# Patient Record
Sex: MALE | Race: ASIAN | NOT HISPANIC OR LATINO
[De-identification: names, ages, dates, MRNs, and addresses within clinical notes are randomized per-mention and may not be internally consistent; named-entity substitution may affect disease eponyms.]

---

## 2020-01-02 VITALS
HEART RATE: 88 BPM | TEMPERATURE: 99 F | WEIGHT: 190.04 LBS | DIASTOLIC BLOOD PRESSURE: 62 MMHG | HEIGHT: 64 IN | OXYGEN SATURATION: 98 % | SYSTOLIC BLOOD PRESSURE: 127 MMHG

## 2020-01-02 RX ORDER — CHLORHEXIDINE GLUCONATE 213 G/1000ML
1 SOLUTION TOPICAL ONCE
Refills: 0 | Status: DISCONTINUED | OUTPATIENT
Start: 2020-01-07 | End: 2020-01-07

## 2020-01-02 NOTE — H&P ADULT - HISTORY OF PRESENT ILLNESS
***SKELETON*** (Patient did not answer phone)    53yo female _(smoking status)__ with __(FHx)__ and a PMHx of DM (HgbA1c 5.90 on 10/31/19), HTN, and AS __(ask if anything else)__ who presented to Dr. Sriram Phan on 12/26/19 for evaluation of chest pain (non-exertional, self-resolving, __expand if necessary__) and aortic stenosis. Pt denies palpitations, SOB, N/V, lightheadedness, dizziness. Echo (11/26/19): Normal LV size; EF 55-60%; Marked calcification and reduced cuspal separation on aortic valve (moderate AS); mild MR. CCTA (11/27/19): total calcium score 25 (59th percentile), LM normal, LAD minor luminal irregularities, LCx calcified plaque in proximal segment with 50-75% stenosis, RCA minor luminal irregularities.    PAST LABS:  -11/27/19: WBC 7.0, Hgb 13.1, Hct 40.3, Platelet 231, Cr 0.9, K 4.7.   -12/26/19: WBC 7.5, Hgb 14.0, Hct 41.5, Platelet 264, Cr 0.92, K 4.4.    In light of patient’s risk factors, CCS class III symptoms, and abnormal CCTA results patient presents to [facility] for cardiac catheterization with possible intervention if clinically indicated. ***SKELETON*** (Patient did not answer phone)    55yo female _(smoking status)__ with __(FHx)__ and a PMHx of DM (HgbA1c 5.90 on 10/31/19), HTN, and AS __(ask if anything else)__ who presented to Dr. Sriram Phan on 12/26/19 for evaluation of chest pain (non-exertional, self-resolving, __expand if necessary__) and aortic stenosis. Pt denies palpitations, SOB, N/V, lightheadedness, dizziness. Echo (11/26/19): Normal LV size; EF 55-60%; Marked calcification and reduced cuspal separation on aortic valve (moderate AS), aortic valve mean gradient 24mmHg and estimated valve area 1.3; mild MR. CCTA (11/27/19): total calcium score 25 (59th percentile), LM normal, LAD minor luminal irregularities, LCx calcified plaque in proximal segment with 50-75% stenosis, RCA minor luminal irregularities; ascending aortic dilatation of 4.3cm noted.    PAST LABS:  -11/27/19: WBC 7.0, Hgb 13.1, Hct 40.3, Platelet 231, Cr 0.9, K 4.7.   -12/26/19: WBC 7.5, Hgb 14.0, Hct 41.5, Platelet 264, Cr 0.92, K 4.4.    In light of patient’s risk factors, CCS class III symptoms, and abnormal CCTA results patient presents to [facility] for cardiac catheterization with possible intervention if clinically indicated. ***SKELETON*** (Patient did not answer phone)    53yo male _(smoking status)__ with __(FHx)__ and a PMHx of DM (HgbA1c 5.90 on 10/31/19), HTN, and AS __(ask if anything else)__ who presented to Dr. Sriram Phan on 12/26/19 for evaluation of chest pain (non-exertional, self-resolving, __expand if necessary__) and aortic stenosis. Pt denies palpitations, SOB, N/V, lightheadedness, dizziness. Echo (11/26/19): Normal LV size; EF 55-60%; Marked calcification and reduced cuspal separation on aortic valve (moderate AS), aortic valve mean gradient 24mmHg and estimated valve area 1.3; mild MR. CCTA (11/27/19): total calcium score 25 (59th percentile), LM normal, LAD minor luminal irregularities, LCx calcified plaque in proximal segment with 50-75% stenosis, RCA minor luminal irregularities; ascending aortic dilatation of 4.3cm noted.    PAST LABS:  -11/27/19: WBC 7.0, Hgb 13.1, Hct 40.3, Platelet 231, Cr 0.9, K 4.7.   -12/26/19: WBC 7.5, Hgb 14.0, Hct 41.5, Platelet 264, Cr 0.92, K 4.4.    In light of patient’s risk factors, CCS class III symptoms, and abnormal CCTA results patient presents to [facility] for cardiac catheterization with possible intervention if clinically indicated. 53yo male _(smoking status)__ with __(FHx)__ and a PMHx of DM (HgbA1c 5.90 on 10/31/19), HTN, and AS __(ask if anything else)__ who presented to Dr. Sriram Phan on 12/26/19 for evaluation of chest pain (non-exertional, self-resolving, __expand if necessary__) and aortic stenosis. Pt denies palpitations, SOB, N/V, lightheadedness, dizziness. Echo (11/26/19): Normal LV size; EF 55-60%; Marked calcification and reduced cuspal separation on aortic valve (moderate AS), aortic valve mean gradient 24mmHg and estimated valve area 1.3; mild MR. CCTA (11/27/19): total calcium score 25 (59th percentile), LM normal, LAD minor luminal irregularities, LCx calcified plaque in proximal segment with 50-75% stenosis, RCA minor luminal irregularities; ascending aortic dilatation of 4.3cm noted.    In light of patient’s risk factors, CCS class III symptoms, and abnormal CCTA results patient presents to [facility] for cardiac catheterization with possible intervention if clinically indicated. History obtained via [employer] , [place], ID # [id number].    53 y/o male, Irish speaking, with PMHx of HTN, HLD, DM (HgbA1c 5.90 on 10/31/19, diet controlled), CAD, s/p Cardiac Cath 3/2017 with Dr. Phan @ [facility] with questionable PCI/Stents (per pt), moderate Aortic Stenosis on recent ECHO.  Pt presented to Dr. Sriram Phan on 12/26/19 with c/o worsening, non-exertional,  intermittent, 5/10,  left-sided chest pain, lasting 1-2 hours, a/w mild SOB. Pt also endorsed "head feeling hot" most of the times and ongoing constipation despite being on bowel regimen.   Pt denies palpitations, diaphoresis, syncope, LOC, lightheadedness, dizziness, abdominal pain/discomfort, N/V, bleeding issues, prior MI, CHF or CVA.  Echo (11/26/19): Normal LV size; EF 55-60%; Marked calcification and reduced cuspal separation on aortic valve (moderate AS), aortic valve mean gradient 24mmHg and estimated valve area 1.3; mild MR. CCTA (11/27/19): total calcium score 25 (59th percentile), LM normal, LAD minor luminal irregularities, LCx calcified plaque in proximal segment with 50-75% stenosis, RCA minor luminal irregularities; ascending aortic dilatation of 4.3cm noted.    In light of patient’s risk factors, CCS class III symptoms, and abnormal CCTA results patient presents to [facility] for Right and Left Heart Catheterization with possible intervention if clinically indicated. History obtained via [employer] , [place], ID # [id number].    53 y/o male, Hebrew speaking, with PMHx of HTN, HLD, DM (HgbA1c 5.90 on 10/31/19, diet controlled), CAD, s/p Cardiac Cath 3/2017 with Dr. Phan @ [facility] with questionable PCI/Stents (per pt), moderate Aortic Stenosis on recent ECHO.  Pt presented to Dr. Sriram Phan on 12/26/19 with c/o worsening, non-exertional,  intermittent, 5/10,  left-sided chest pain, lasting 1-2 hours, a/w mild SOB. Pt also endorsed "head feeling hot" most of the times and ongoing constipation despite being on bowel regimen.   Pt denies palpitations, diaphoresis, syncope, LOC, lightheadedness, dizziness, abdominal pain/discomfort, N/V, bleeding issues, prior MI, CHF or CVA.  Echo (11/26/19): Normal LV size; EF 55-60%; Marked calcification and reduced cuspal separation on aortic valve (moderate AS), aortic valve mean gradient 24mmHg and estimated valve area 1.3; mild MR. CCTA (11/27/19): total calcium score 25 (59th percentile), LM normal, LAD minor luminal irregularities, LCx calcified plaque in proximal segment with 50-75% stenosis, RCA minor luminal irregularities; ascending aortic dilatation of 4.3cm noted.    In light of patient’s risk factors, CCS class III symptoms, AS and abnormal CCTA results patient presents to [facility] for Right and Left Heart Catheterization with possible intervention if clinically indicated.

## 2020-01-02 NOTE — H&P ADULT - ASSESSMENT
ASA:   Mallampati:      Risks & benefits of procedure and alternative therapy have been explained to the patient including but not limited to: allergic reaction, bleeding w/possible need for blood transfusion, infection, renal and vascular compromise, limb damage, arrhythmia, stroke, vessel dissection/perforation, Myocardial infarction, emergent CABG. Informed consent obtained and in chart. History obtained via [employer] , [place], ID # [id number].    53 y/o male, Polish speaking, with PMHx HTN, HLD, DM (HgbA1c 5.90 on 10/31/19, diet controlled), CAD, s/p Cardiac Cath 3/2017 with Dr. Phan @ [facility] with questionable PCI/Stents (per pt), moderate Aortic Stenosis on recent ECHO presented to Dr. Sriram Phan on 12/26/19 with c/o CCS Class IV Anginal symptoms and underwent an Echo (11/26/19): Normal LV size; EF 55-60%; Marked calcification and reduced cuspal separation on aortic valve (moderate AS), aortic valve mean gradient 24mmHg and estimated valve area 1.3; mild MR and subsequent CCTA (11/27/19): total calcium score 25 (59th percentile), LM normal, LAD minor luminal irregularities, LCx calcified plaque in proximal segment with 50-75% stenosis, RCA minor luminal irregularities; ascending aortic dilatation of 4.3cm noted.  Given pt's risk factors, CCS class IV Anginal symptoms, and abnormal CCTA results, pt now for recommended  Right and Left Heart Catheterization with possible intervention if clinically indicated.     ASA: III            Mallampati: II    Risks & benefits of procedure and alternative therapy have been explained to the patient including but not limited to: allergic reaction, bleeding w/possible need for blood transfusion, infection, renal and vascular compromise, limb damage, arrhythmia, stroke, vessel dissection/perforation, Myocardial infarction, emergent CABG. Informed consent obtained and in christiano  Of note:  ASA 81mg and Plavix 75mg x dose given (pt on DAPT at home).  No IVF given history of moderate AS.  No History obtained via [employer] , [place], ID # [id number].    55 y/o male, Serbian speaking, with PMHx HTN, HLD, DM (HgbA1c 5.90 on 10/31/19, diet controlled), CAD, s/p Cardiac Cath 3/2017 with Dr. Phan @ [facility] with questionable PCI/Stents (per pt), moderate Aortic Stenosis on recent ECHO presented to Dr. Sriram Phan on 12/26/19 with c/o CCS Class IV Anginal symptoms and underwent an Echo (11/26/19): Normal LV size; EF 55-60%; Marked calcification and reduced cuspal separation on aortic valve (moderate AS), aortic valve mean gradient 24mmHg and estimated valve area 1.3; mild MR and subsequent CCTA (11/27/19): total calcium score 25 (59th percentile), LM normal, LAD minor luminal irregularities, LCx calcified plaque in proximal segment with 50-75% stenosis, RCA minor luminal irregularities; ascending aortic dilatation of 4.3cm noted.  Given pt's risk factors, CCS class III Anginal symptoms, AS, and abnormal CCTA results, pt now for recommended  Right and Left Heart Catheterization with possible intervention if clinically indicated.     ASA: III            Mallampati: II    Risks & benefits of procedure and alternative therapy have been explained to the patient including but not limited to: allergic reaction, bleeding w/possible need for blood transfusion, infection, renal and vascular compromise, limb damage, arrhythmia, stroke, vessel dissection/perforation, Myocardial infarction, emergent CABG. Informed consent obtained and in christiano  Of note:  ASA 81mg and Plavix 75mg x dose given (pt on DAPT at home).  No IVF given history of moderate AS.  No History obtained via [employer] , [place], ID # [id number].    53 y/o male, Italian speaking, with PMHx HTN, HLD, DM (HgbA1c 5.90 on 10/31/19, diet controlled), CAD, s/p Cardiac Cath 3/2017 with Dr. Phan @ [facility] with questionable PCI/Stents (per pt), moderate Aortic Stenosis on recent ECHO presented to Dr. Sriram Phan on 12/26/19 with c/o CCS Class IV Anginal symptoms and underwent an Echo (11/26/19): Normal LV size; EF 55-60%; Marked calcification and reduced cuspal separation on aortic valve (moderate AS), aortic valve mean gradient 24mmHg and estimated valve area 1.3; mild MR and subsequent CCTA (11/27/19): total calcium score 25 (59th percentile), LM normal, LAD minor luminal irregularities, LCx calcified plaque in proximal segment with 50-75% stenosis, RCA minor luminal irregularities; ascending aortic dilatation of 4.3cm noted.  Given pt's risk factors, CCS class III Anginal symptoms, AS, and abnormal CCTA results, pt now for recommended  Right and Left Heart Catheterization with possible intervention if clinically indicated.     ASA: III            Mallampati: II    Risks & benefits of procedure and alternative therapy have been explained to the patient including but not limited to: allergic reaction, bleeding w/possible need for blood transfusion, infection, renal and vascular compromise, limb damage, arrhythmia, stroke, vessel dissection/perforation, Myocardial infarction, emergent CABG. Informed consent obtained and in chart  Of note:  ASA 81mg and Plavix 75mg x dose given (pt on DAPT at home).  No IVF given history of moderate AS.  Pt with allergic reaction to shrimp (Itching); Dr. Phan made aware, prophylaxis needed at this time. History obtained via [employer] , [place], ID # [id number].    53 y/o male, Yoruba speaking, with PMHx HTN, HLD, DM (HgbA1c 5.90 on 10/31/19, diet controlled), CAD, s/p Cardiac Cath 3/2017 with Dr. Phan @ [facility] with questionable PCI/Stents (per pt), moderate Aortic Stenosis on recent ECHO presented to Dr. Sriram Phan on 12/26/19 with c/o CCS Class IV Anginal symptoms and underwent an Echo (11/26/19): Normal LV size; EF 55-60%; Marked calcification and reduced cuspal separation on aortic valve (moderate AS), aortic valve mean gradient 24mmHg and estimated valve area 1.3; mild MR and subsequent CCTA (11/27/19): total calcium score 25 (59th percentile), LM normal, LAD minor luminal irregularities, LCx calcified plaque in proximal segment with 50-75% stenosis, RCA minor luminal irregularities; ascending aortic dilatation of 4.3cm noted.  Given pt's risk factors, CCS class III Anginal symptoms, AS, and abnormal CCTA results, pt now for recommended  Right and Left Heart Catheterization with possible intervention if clinically indicated.     ASA: III            Mallampati: II    Risks & benefits of procedure and alternative therapy have been explained to the patient including but not limited to: allergic reaction, bleeding w/possible need for blood transfusion, infection, renal and vascular compromise, limb damage, arrhythmia, stroke, vessel dissection/perforation, Myocardial infarction, emergent CABG. Informed consent obtained and in chart  Of note:  ASA 81mg and Plavix 75mg x dose given (pt on DAPT at home).  No IVF given history of moderate AS.  Pt with allergic reaction to shrimp (Itching); Dr. Phan made aware, no prophylaxis needed at this time.  Hypokalemia (K+ 3.8), repleted with Kdur 40 meq x 1 dose.

## 2020-01-02 NOTE — H&P ADULT - NSHPLABSRESULTS_GEN_ALL_CORE
13.8   8.13  )-----------( 239      ( 07 Jan 2020 08:06 )             43.2         PT/INR - ( 07 Jan 2020 08:06 )   PT: 10.9 sec;   INR: 0.96          PTT - ( 07 Jan 2020 08:06 )  PTT:32.5 sec              EKG: 13.8   8.13  )-----------( 239      ( 07 Jan 2020 08:06 )             43.2       01-07    141  |  100  |  10  ----------------------------<  131<H>  3.8   |  26  |  0.86    Ca    9.6      07 Jan 2020 08:06    TPro  7.6  /  Alb  4.9  /  TBili  0.4  /  DBili  x   /  AST  19  /  ALT  19  /  AlkPhos  106  01-07      PT/INR - ( 07 Jan 2020 08:06 )   PT: 10.9 sec;   INR: 0.96          PTT - ( 07 Jan 2020 08:06 )  PTT:32.5 sec      EKG: NSR @ 88 bpm, ?? old inferior infarct.

## 2020-01-06 ENCOUNTER — FORM ENCOUNTER (OUTPATIENT)
Age: 55
End: 2020-01-06

## 2020-01-07 ENCOUNTER — OUTPATIENT (OUTPATIENT)
Dept: OUTPATIENT SERVICES | Facility: HOSPITAL | Age: 55
LOS: 1 days | Discharge: NURSING, REHAB OR LONG-TERM CARE | End: 2020-01-07
Payer: MEDICAID

## 2020-01-07 LAB
ALBUMIN SERPL ELPH-MCNC: 4.9 G/DL — SIGNIFICANT CHANGE UP (ref 3.3–5)
ALP SERPL-CCNC: 106 U/L — SIGNIFICANT CHANGE UP (ref 40–120)
ALT FLD-CCNC: 19 U/L — SIGNIFICANT CHANGE UP (ref 10–45)
ANION GAP SERPL CALC-SCNC: 15 MMOL/L — SIGNIFICANT CHANGE UP (ref 5–17)
APPEARANCE UR: CLEAR — SIGNIFICANT CHANGE UP
APTT BLD: 32.5 SEC — SIGNIFICANT CHANGE UP (ref 27.5–36.3)
AST SERPL-CCNC: 19 U/L — SIGNIFICANT CHANGE UP (ref 10–40)
BASOPHILS # BLD AUTO: 0.04 K/UL — SIGNIFICANT CHANGE UP (ref 0–0.2)
BASOPHILS NFR BLD AUTO: 0.5 % — SIGNIFICANT CHANGE UP (ref 0–2)
BILIRUB SERPL-MCNC: 0.4 MG/DL — SIGNIFICANT CHANGE UP (ref 0.2–1.2)
BILIRUB UR-MCNC: NEGATIVE — SIGNIFICANT CHANGE UP
BLD GP AB SCN SERPL QL: NEGATIVE — SIGNIFICANT CHANGE UP
BUN SERPL-MCNC: 10 MG/DL — SIGNIFICANT CHANGE UP (ref 7–23)
CALCIUM SERPL-MCNC: 9.6 MG/DL — SIGNIFICANT CHANGE UP (ref 8.4–10.5)
CHLORIDE SERPL-SCNC: 100 MMOL/L — SIGNIFICANT CHANGE UP (ref 96–108)
CHOLEST SERPL-MCNC: 132 MG/DL — SIGNIFICANT CHANGE UP (ref 10–199)
CK MB CFR SERPL CALC: 4.2 NG/ML — SIGNIFICANT CHANGE UP (ref 0–6.7)
CK SERPL-CCNC: 181 U/L — SIGNIFICANT CHANGE UP (ref 30–200)
CO2 SERPL-SCNC: 26 MMOL/L — SIGNIFICANT CHANGE UP (ref 22–31)
COLOR SPEC: YELLOW — SIGNIFICANT CHANGE UP
CREAT SERPL-MCNC: 0.86 MG/DL — SIGNIFICANT CHANGE UP (ref 0.5–1.3)
DIFF PNL FLD: NEGATIVE — SIGNIFICANT CHANGE UP
EOSINOPHIL # BLD AUTO: 0.4 K/UL — SIGNIFICANT CHANGE UP (ref 0–0.5)
EOSINOPHIL NFR BLD AUTO: 4.9 % — SIGNIFICANT CHANGE UP (ref 0–6)
GLUCOSE BLDC GLUCOMTR-MCNC: 97 MG/DL — SIGNIFICANT CHANGE UP (ref 70–99)
GLUCOSE SERPL-MCNC: 131 MG/DL — HIGH (ref 70–99)
GLUCOSE UR QL: NEGATIVE — SIGNIFICANT CHANGE UP
HBA1C BLD-MCNC: 5.8 % — HIGH (ref 4–5.6)
HBV SURFACE AG SER-ACNC: SIGNIFICANT CHANGE UP
HCT VFR BLD CALC: 43.2 % — SIGNIFICANT CHANGE UP (ref 39–50)
HCV AB S/CO SERPL IA: 0.18 S/CO — SIGNIFICANT CHANGE UP
HCV AB SERPL-IMP: SIGNIFICANT CHANGE UP
HDLC SERPL-MCNC: 52 MG/DL — SIGNIFICANT CHANGE UP
HGB BLD-MCNC: 13.8 G/DL — SIGNIFICANT CHANGE UP (ref 13–17)
IMM GRANULOCYTES NFR BLD AUTO: 0.4 % — SIGNIFICANT CHANGE UP (ref 0–1.5)
INR BLD: 0.96 — SIGNIFICANT CHANGE UP (ref 0.88–1.16)
KETONES UR-MCNC: NEGATIVE — SIGNIFICANT CHANGE UP
LEUKOCYTE ESTERASE UR-ACNC: NEGATIVE — SIGNIFICANT CHANGE UP
LIPID PNL WITH DIRECT LDL SERPL: 54 MG/DL — SIGNIFICANT CHANGE UP
LYMPHOCYTES # BLD AUTO: 2.71 K/UL — SIGNIFICANT CHANGE UP (ref 1–3.3)
LYMPHOCYTES # BLD AUTO: 33.3 % — SIGNIFICANT CHANGE UP (ref 13–44)
MCHC RBC-ENTMCNC: 28 PG — SIGNIFICANT CHANGE UP (ref 27–34)
MCHC RBC-ENTMCNC: 31.9 GM/DL — LOW (ref 32–36)
MCV RBC AUTO: 87.6 FL — SIGNIFICANT CHANGE UP (ref 80–100)
MONOCYTES # BLD AUTO: 0.65 K/UL — SIGNIFICANT CHANGE UP (ref 0–0.9)
MONOCYTES NFR BLD AUTO: 8 % — SIGNIFICANT CHANGE UP (ref 2–14)
NEUTROPHILS # BLD AUTO: 4.3 K/UL — SIGNIFICANT CHANGE UP (ref 1.8–7.4)
NEUTROPHILS NFR BLD AUTO: 52.9 % — SIGNIFICANT CHANGE UP (ref 43–77)
NITRITE UR-MCNC: NEGATIVE — SIGNIFICANT CHANGE UP
NRBC # BLD: 0 /100 WBCS — SIGNIFICANT CHANGE UP (ref 0–0)
PH UR: 7.5 — SIGNIFICANT CHANGE UP (ref 5–8)
PLATELET # BLD AUTO: 239 K/UL — SIGNIFICANT CHANGE UP (ref 150–400)
POTASSIUM SERPL-MCNC: 3.8 MMOL/L — SIGNIFICANT CHANGE UP (ref 3.5–5.3)
POTASSIUM SERPL-SCNC: 3.8 MMOL/L — SIGNIFICANT CHANGE UP (ref 3.5–5.3)
PROT SERPL-MCNC: 7.6 G/DL — SIGNIFICANT CHANGE UP (ref 6–8.3)
PROT UR-MCNC: NEGATIVE MG/DL — SIGNIFICANT CHANGE UP
PROTHROM AB SERPL-ACNC: 10.9 SEC — SIGNIFICANT CHANGE UP (ref 10–12.9)
RBC # BLD: 4.93 M/UL — SIGNIFICANT CHANGE UP (ref 4.2–5.8)
RBC # FLD: 13.5 % — SIGNIFICANT CHANGE UP (ref 10.3–14.5)
RH IG SCN BLD-IMP: POSITIVE — SIGNIFICANT CHANGE UP
SODIUM SERPL-SCNC: 141 MMOL/L — SIGNIFICANT CHANGE UP (ref 135–145)
SP GR SPEC: 1.01 — SIGNIFICANT CHANGE UP (ref 1–1.03)
TOTAL CHOLESTEROL/HDL RATIO MEASUREMENT: 2.5 RATIO — LOW (ref 3.4–9.6)
TRIGL SERPL-MCNC: 130 MG/DL — SIGNIFICANT CHANGE UP (ref 10–149)
TSH SERPL-MCNC: 1.36 UIU/ML — SIGNIFICANT CHANGE UP (ref 0.35–4.94)
UROBILINOGEN FLD QL: 0.2 E.U./DL — SIGNIFICANT CHANGE UP
WBC # BLD: 8.13 K/UL — SIGNIFICANT CHANGE UP (ref 3.8–10.5)
WBC # FLD AUTO: 8.13 K/UL — SIGNIFICANT CHANGE UP (ref 3.8–10.5)

## 2020-01-07 PROCEDURE — 83036 HEMOGLOBIN GLYCOSYLATED A1C: CPT

## 2020-01-07 PROCEDURE — 82962 GLUCOSE BLOOD TEST: CPT

## 2020-01-07 PROCEDURE — 93880 EXTRACRANIAL BILAT STUDY: CPT

## 2020-01-07 PROCEDURE — 93306 TTE W/DOPPLER COMPLETE: CPT | Mod: 26

## 2020-01-07 PROCEDURE — 71045 X-RAY EXAM CHEST 1 VIEW: CPT | Mod: 26

## 2020-01-07 PROCEDURE — C1894: CPT

## 2020-01-07 PROCEDURE — 85610 PROTHROMBIN TIME: CPT

## 2020-01-07 PROCEDURE — 82553 CREATINE MB FRACTION: CPT

## 2020-01-07 PROCEDURE — 87340 HEPATITIS B SURFACE AG IA: CPT

## 2020-01-07 PROCEDURE — 81003 URINALYSIS AUTO W/O SCOPE: CPT

## 2020-01-07 PROCEDURE — 80061 LIPID PANEL: CPT

## 2020-01-07 PROCEDURE — C1887: CPT

## 2020-01-07 PROCEDURE — 93306 TTE W/DOPPLER COMPLETE: CPT

## 2020-01-07 PROCEDURE — 85025 COMPLETE CBC W/AUTO DIFF WBC: CPT

## 2020-01-07 PROCEDURE — 86803 HEPATITIS C AB TEST: CPT

## 2020-01-07 PROCEDURE — 82550 ASSAY OF CK (CPK): CPT

## 2020-01-07 PROCEDURE — 94010 BREATHING CAPACITY TEST: CPT | Mod: 26

## 2020-01-07 PROCEDURE — C1769: CPT

## 2020-01-07 PROCEDURE — 93460 R&L HRT ART/VENTRICLE ANGIO: CPT | Mod: 26

## 2020-01-07 PROCEDURE — 85730 THROMBOPLASTIN TIME PARTIAL: CPT

## 2020-01-07 PROCEDURE — 86901 BLOOD TYPING SEROLOGIC RH(D): CPT

## 2020-01-07 PROCEDURE — 84443 ASSAY THYROID STIM HORMONE: CPT

## 2020-01-07 PROCEDURE — C1760: CPT

## 2020-01-07 PROCEDURE — 80053 COMPREHEN METABOLIC PANEL: CPT

## 2020-01-07 PROCEDURE — 93460 R&L HRT ART/VENTRICLE ANGIO: CPT

## 2020-01-07 PROCEDURE — 93880 EXTRACRANIAL BILAT STUDY: CPT | Mod: 26

## 2020-01-07 PROCEDURE — 86850 RBC ANTIBODY SCREEN: CPT

## 2020-01-07 PROCEDURE — 94150 VITAL CAPACITY TEST: CPT

## 2020-01-07 PROCEDURE — 71045 X-RAY EXAM CHEST 1 VIEW: CPT

## 2020-01-07 RX ORDER — ASPIRIN/CALCIUM CARB/MAGNESIUM 324 MG
81 TABLET ORAL ONCE
Refills: 0 | Status: COMPLETED | OUTPATIENT
Start: 2020-01-07 | End: 2020-01-07

## 2020-01-07 RX ORDER — PRIMIDONE 250 MG/1
0 TABLET ORAL
Qty: 0 | Refills: 0 | DISCHARGE

## 2020-01-07 RX ORDER — POTASSIUM CHLORIDE 20 MEQ
40 PACKET (EA) ORAL ONCE
Refills: 0 | Status: DISCONTINUED | OUTPATIENT
Start: 2020-01-07 | End: 2020-01-07

## 2020-01-07 RX ORDER — AMITRIPTYLINE HCL 25 MG
1 TABLET ORAL
Qty: 0 | Refills: 0 | DISCHARGE

## 2020-01-07 RX ORDER — ASPIRIN/CALCIUM CARB/MAGNESIUM 324 MG
1 TABLET ORAL
Qty: 0 | Refills: 0 | DISCHARGE

## 2020-01-07 RX ORDER — DOCUSATE SODIUM 100 MG
1 CAPSULE ORAL
Qty: 0 | Refills: 0 | DISCHARGE

## 2020-01-07 RX ORDER — FLUTICASONE PROPIONATE AND SALMETEROL 50; 250 UG/1; UG/1
1 POWDER ORAL; RESPIRATORY (INHALATION)
Qty: 0 | Refills: 0 | DISCHARGE

## 2020-01-07 RX ORDER — FLUTICASONE PROPIONATE 220 MCG
1 AEROSOL WITH ADAPTER (GRAM) INHALATION
Qty: 0 | Refills: 0 | DISCHARGE

## 2020-01-07 RX ORDER — OMEPRAZOLE 10 MG/1
1 CAPSULE, DELAYED RELEASE ORAL
Qty: 0 | Refills: 0 | DISCHARGE

## 2020-01-07 RX ORDER — ALBUTEROL 90 UG/1
2 AEROSOL, METERED ORAL
Qty: 0 | Refills: 0 | DISCHARGE

## 2020-01-07 RX ORDER — INSULIN LISPRO 100/ML
VIAL (ML) SUBCUTANEOUS ONCE
Refills: 0 | Status: DISCONTINUED | OUTPATIENT
Start: 2020-01-07 | End: 2020-01-07

## 2020-01-07 RX ORDER — ATORVASTATIN CALCIUM 80 MG/1
1 TABLET, FILM COATED ORAL
Qty: 0 | Refills: 0 | DISCHARGE

## 2020-01-07 RX ORDER — METOPROLOL TARTRATE 50 MG
1 TABLET ORAL
Qty: 0 | Refills: 0 | DISCHARGE

## 2020-01-07 RX ORDER — LOSARTAN POTASSIUM 100 MG/1
1 TABLET, FILM COATED ORAL
Qty: 0 | Refills: 0 | DISCHARGE

## 2020-01-07 RX ORDER — CLOPIDOGREL BISULFATE 75 MG/1
75 TABLET, FILM COATED ORAL ONCE
Refills: 0 | Status: COMPLETED | OUTPATIENT
Start: 2020-01-07 | End: 2020-01-07

## 2020-01-07 RX ORDER — SODIUM CHLORIDE 9 MG/ML
1000 INJECTION, SOLUTION INTRAVENOUS
Refills: 0 | Status: DISCONTINUED | OUTPATIENT
Start: 2020-01-07 | End: 2020-01-07

## 2020-01-07 RX ADMIN — CLOPIDOGREL BISULFATE 75 MILLIGRAM(S): 75 TABLET, FILM COATED ORAL at 10:18

## 2020-01-07 RX ADMIN — Medication 81 MILLIGRAM(S): at 10:18

## 2020-01-07 NOTE — PROGRESS NOTE ADULT - SUBJECTIVE AND OBJECTIVE BOX
Interventional Cardiology PA SDA Discharge Note    Patient without complaints. Ambulated and voided without difficulties    Afebrile, VSS    Ext:    		Right Groin:       hematoma,     bruit, dressing; C/D/I  		      Pulses:    intact RAD/DP/PT?to baseline     A/P:  53 y/o male, Kyrgyz speaking, with PMHx of HTN, HLD, DM (HgbA1c 5.90 on 10/31/19, diet controlled), CAD, s/p Cardiac Cath 3/2017 with Dr. Phan @ [facility] with questionable PCI/Stents (per pt), moderate Aortic Stenosis on recent ECHO.  Pt presented to Dr. Sriram Phan on 19 with c/o worsening, non-exertional,  intermittent, 5/10,  left-sided chest pain, lasting 1-2 hours, a/w mild SOB. Pt also endorsed "head feeling hot" most of the times and ongoing constipation despite being on bowel regimen. Pt denies palpitations, diaphoresis, syncope, LOC, lightheadedness, dizziness, abdominal pain/discomfort, N/V, bleeding issues, prior MI, CHF or CVA. Echo (19): Normal LV size; EF 55-60%; Marked calcification and reduced cuspal separation on aortic valve (moderate AS), aortic valve mean gradient 24mmHg and estimated valve area 1.3; mild MR. CCTA (19): total calcium score 25 (59th percentile), LM normal, LAD minor luminal irregularities, LCx calcified plaque in proximal segment with 50-75% stenosis, RCA minor luminal irregularities; ascending aortic dilatation of 4.3cm noted. In light of patient’s risk factors, CCS class III symptoms, AS and abnormal CCTA results patient presents to [facility] for Right and Left Heart Catheterization with possible intervention if clinically indicated.     Pt is s/p cardiac catheterization revealin.	Stable for discharge as per attending Dr. Phan after bed rest, pt voids, groin/wrist stable and 30 minutes of ambulation.  2.	Follow-up with PMD/Cardiologist ___________ in 1-2 weeks  3.	Discharged forms signed and copies in chart Interventional Cardiology PA SDA Discharge Note    Patient without complaints. Ambulated and voided without difficulties    Afebrile, VSS    Ext: Right Groin: no hematoma, dressing; C/D/I  		      Pulses: intact DP/PT to baseline     A/P:  55 y/o male, Amharic speaking, with PMHx of HTN, HLD, DM (HgbA1c 5.90 on 10/31/19, diet controlled), nonobstructive CAD by diagnostic cardiac cath 3/2017 with Dr. Phan @ [facility], moderate Aortic Stenosis on recent ECHO.  Pt presented to Dr. Sriram Phan on 12/26/19 with c/o worsening, non-exertional,  intermittent, 5/10,  left-sided chest pain, lasting 1-2 hours, a/w mild SOB. Pt also endorsed "head feeling hot" most of the times and ongoing constipation despite being on bowel regimen. Pt denies palpitations, diaphoresis, syncope, LOC, lightheadedness, dizziness, abdominal pain/discomfort, N/V, bleeding issues, prior MI, CHF or CVA. Echo (11/26/19): Normal LV size; EF 55-60%; Marked calcification and reduced cuspal separation on aortic valve (moderate AS), aortic valve mean gradient 24mmHg and estimated valve area 1.3; mild MR. CCTA (11/27/19): total calcium score 25 (59th percentile), LM normal, LAD minor luminal irregularities, LCx calcified plaque in proximal segment with 50-75% stenosis, RCA minor luminal irregularities; ascending aortic dilatation of 4.3cm noted. In light of patient’s risk factors, CCS class III symptoms, AS and abnormal CCTA results patient presents to [facility] for Right and Left Heart Catheterization with possible intervention if clinically indicated.     Pt is s/p diagnostic left and right cardiac catheterization on 1/7/20  revealing       1.	Stable for discharge as per attending Dr. Phan after bed rest, pt voids, groin/wrist stable and 30 minutes of ambulation.  2.	Follow-up with Cardiologist Sylvester in 1-2 weeks  3.	Discharged forms signed and copies in chart Interventional Cardiology PA SDA Discharge Note    Patient without complaints. Ambulated and voided without difficulties    Afebrile, VSS    Ext: Right Groin: no hematoma, dressing; C/D/I    Pulses: intact DP/PT to baseline     A/P:  53 y/o male, Slovenian speaking, with PMHx of HTN, HLD, DM (HgbA1c 5.90 on 10/31/19, diet controlled), nonobstructive CAD by diagnostic cardiac cath 3/2017 with Dr. Phan @ [facility], moderate Aortic Stenosis on recent ECHO.  Pt presented to Dr. Sriram Phan on 12/26/19 with c/o worsening, non-exertional,  intermittent, 5/10,  left-sided chest pain, lasting 1-2 hours, a/w mild SOB. Pt also endorsed "head feeling hot" most of the times and ongoing constipation despite being on bowel regimen. Pt denies palpitations, diaphoresis, syncope, LOC, lightheadedness, dizziness, abdominal pain/discomfort, N/V, bleeding issues, prior MI, CHF or CVA. Echo (11/26/19): Normal LV size; EF 55-60%; Marked calcification and reduced cuspal separation on aortic valve (moderate AS), aortic valve mean gradient 24mmHg and estimated valve area 1.3; mild MR. CCTA (11/27/19): total calcium score 25 (59th percentile), LM normal, LAD minor luminal irregularities, LCx calcified plaque in proximal segment with 50-75% stenosis, RCA minor luminal irregularities; ascending aortic dilatation of 4.3cm noted. In light of patient’s risk factors, CCS class III symptoms, AS and abnormal CCTA results patient presents to [facility] for Right and Left Heart Catheterization with possible intervention if clinically indicated.     Pt is s/p diagnostic left and right cardiac catheterization on 1/7/20  revealing       1.	Stable for discharge as per attending Dr. Phan after bed rest, pt voids, groin/wrist stable and 30 minutes of ambulation.  2.	Follow-up with Cardiologist Sylvester in 1-2 weeks  3.	Discharged forms signed and copies in chart Interventional Cardiology PA SDA Discharge Note    Patient without complaints. Ambulated and voided without difficulties    Afebrile, VSS    Ext: Right Groin: no hematoma, dressing; C/D/I    Pulses: intact DP/PT to baseline     A/P:  55 y/o male, Greek speaking, with PMHx of HTN, HLD, DM (HgbA1c 5.90 on 10/31/19, diet controlled), nonobstructive CAD by diagnostic cardiac cath 3/2017 with Dr. Phan @ [facility], moderate Aortic Stenosis on recent ECHO.  Pt presented to Dr. Sriram Phan on 12/26/19 with c/o worsening, non-exertional,  intermittent, 5/10,  left-sided chest pain, lasting 1-2 hours, a/w mild SOB. Pt also endorsed "head feeling hot" most of the times and ongoing constipation despite being on bowel regimen. Pt denies palpitations, diaphoresis, syncope, LOC, lightheadedness, dizziness, abdominal pain/discomfort, N/V, bleeding issues, prior MI, CHF or CVA. Echo (11/26/19): Normal LV size; EF 55-60%; Marked calcification and reduced cuspal separation on aortic valve (moderate AS), aortic valve mean gradient 24mmHg and estimated valve area 1.3; mild MR. CCTA (11/27/19): total calcium score 25 (59th percentile), LM normal, LAD minor luminal irregularities, LCx calcified plaque in proximal segment with 50-75% stenosis, RCA minor luminal irregularities; ascending aortic dilatation of 4.3cm noted. In light of patient’s risk factors, CCS class III symptoms, AS and abnormal CCTA results patient presents to [facility] for Right and Left Heart Catheterization with possible intervention if clinically indicated.     Pt is s/p diagnostic left and right cardiac catheterization on 1/7/20  revealing RA 4mmHg, RV 27/5, PA 28/15 (14 mmHg), PCWP 6 mmHg, EDDIE CO: 6.44, EDDIE 3.37, LM luminal, dLAD 99%, apical small segment, mid luminal OM2, 30% pLCx small vessel, RCA dominant, severe AS mean gradient 60, valve area 0.77. CTS consulted for possible TAVR vs SAVR.        1.	Stable for discharge as per attending Dr. Phan after bed rest, pt voids, groin/wrist stable and 30 minutes of ambulation.  2.	Follow-up with Cardiologist Sylvester in 1-2 weeks.  3.	Discharged forms signed and copies in chart

## 2020-01-07 NOTE — PROGRESS NOTE ADULT - SUBJECTIVE AND OBJECTIVE BOX
CORONARY ANGIOGRAPHY  01/7/2020    Indication: moderate AS, chest pain    Conclusion  Frailty Index: 3    1. Coronary Angiography  LM: luminal irregularities  LAD: distal LAD 99% subtotal, very apical small vessel <2.0 mm  LCx: mild luminal irregularities, OM2 30% proximal stenosis  RCA: dominant, luminal irregularities    2. Right heart cath  Other: Right heart pressures - Normal  RA: 4 mmHg  RV: 27/5 mmHg  PA: 28/15 (14 mmHg)  PCWP: 6 mmHg  PA sat: 74%  FA sat: 95%  George CO: 6.44   George CI: 3.37    3. Left Heart Cath  LVEF 55%, normal wall motion, LVEDP 5 mmHg  +severe AS, mean gradient 60 mmHg, STEPHON=0.77 cm^2     Recommendations  non-obstructive CAD  severe Aortic Stenosis  will refer for TAVR vs SAVR candidate  follow up with Dr. Phan    Access: R CFA, perclose

## 2020-01-08 PROBLEM — Z00.00 ENCOUNTER FOR PREVENTIVE HEALTH EXAMINATION: Status: ACTIVE | Noted: 2020-01-08

## 2020-01-09 PROBLEM — Z86.79 HISTORY OF AORTIC VALVE STENOSIS: Status: RESOLVED | Noted: 2020-01-09 | Resolved: 2020-01-09

## 2020-01-09 PROBLEM — I71.2 ASCENDING AORTIC ANEURYSM: Status: ACTIVE | Noted: 2020-01-09

## 2020-01-09 PROBLEM — Z82.5 FAMILY HISTORY OF ASTHMA: Status: ACTIVE | Noted: 2020-01-09

## 2020-01-09 PROBLEM — E78.1 HIGH BLOOD TRIGLYCERIDES: Status: RESOLVED | Noted: 2020-01-09 | Resolved: 2020-01-09

## 2020-01-09 PROBLEM — I35.0 AORTIC STENOSIS: Status: ACTIVE | Noted: 2020-01-09

## 2020-01-09 PROBLEM — Z78.9 NON-SMOKER: Status: ACTIVE | Noted: 2020-01-09

## 2020-01-09 PROBLEM — Z86.69 HISTORY OF TREMOR: Status: RESOLVED | Noted: 2020-01-09 | Resolved: 2020-01-09

## 2020-01-09 PROBLEM — Z86.010 HISTORY OF COLONIC POLYPS: Status: RESOLVED | Noted: 2020-01-09 | Resolved: 2020-01-09

## 2020-01-09 PROBLEM — Z87.09 HISTORY OF ASTHMA: Status: RESOLVED | Noted: 2020-01-09 | Resolved: 2020-01-09

## 2020-01-09 PROBLEM — Z87.09 HISTORY OF PULMONARY EMPHYSEMA: Status: RESOLVED | Noted: 2020-01-09 | Resolved: 2020-01-09

## 2020-01-09 PROBLEM — E66.9 CLASS 1 OBESITY: Status: RESOLVED | Noted: 2020-01-09 | Resolved: 2020-01-09

## 2020-01-09 PROBLEM — Z86.69 HISTORY OF TENSION HEADACHE: Status: RESOLVED | Noted: 2020-01-09 | Resolved: 2020-01-09

## 2020-01-09 PROBLEM — Z86.79 HISTORY OF HYPERTENSION: Status: RESOLVED | Noted: 2020-01-09 | Resolved: 2020-01-09

## 2020-01-09 PROBLEM — R73.03 PREDIABETES: Status: RESOLVED | Noted: 2020-01-09 | Resolved: 2020-01-09

## 2020-01-09 RX ORDER — CLOPIDOGREL 75 MG/1
75 TABLET, FILM COATED ORAL DAILY
Qty: 30 | Refills: 3 | Status: ACTIVE | COMMUNITY

## 2020-01-09 RX ORDER — GINGER ROOT/GINGER ROOT EXT 262.5 MG
CAPSULE ORAL
Refills: 0 | Status: ACTIVE | COMMUNITY

## 2020-01-09 RX ORDER — AMITRIPTYLINE HYDROCHLORIDE 10 MG/1
10 TABLET, FILM COATED ORAL
Qty: 30 | Refills: 0 | Status: ACTIVE | COMMUNITY

## 2020-01-09 RX ORDER — ATORVASTATIN CALCIUM 10 MG/1
10 TABLET, FILM COATED ORAL
Qty: 1 | Refills: 2 | Status: ACTIVE | COMMUNITY

## 2020-01-09 NOTE — CONSULT LETTER
[Dear  ___] : Dear  [unfilled], [Sincerely,] : Sincerely, [FreeTextEntry1] : \par I had the pleasure of seeing your patient, SULTAN SOLORZANO,in my office today. \par \par We take a multidisciplinary team approach to patient care and consider you, the physician, an extension of our team. We will maintain an open line of communication with you throughout your patient's treatment course. \par \par He is being evaluated for aortic stenosis and an ascending aortic aneurysm. I have reviewed all of the patient's medical records and diagnostic images at the time of his office consultation. I have enclosed a copy for your records. \par \par I have reviewed the indications for surgery,and used our webpage [email or web address] <[email or web address]> to illustrate the aorta and anatomy of the heart. The patient meets criteria for surgery. I have recommended that the patient is a candidate for a ______________. \par \par Surgery is scheduled for ___________.  I will update you on his perioperative status and disposition upon discharge. \par \par I appreciate the opportunity to care for your patient at the [facility] based at [facility]. If there are any questions or concerns, please call me directly at [phone]. \par   [FreeTextEntry2] : Dr. Sriram Phan  [FreeTextEntry3] : \par Justin Hinton M.D.\par Professor of Cardiovascular and Thoracic Surgery\par Minimally Invasive Valve Surgeon\par Director of Aortic Surgery, [facility]\par Cell: [phone]\par Email: [email or web address] \par \par [facility]:\par [address], [address] Floor, [place] [address]\par Office: [phone]\par Fax: [phone]\par \par [facility]:\par Department of Cardiovascular and Thoracic Surgery\par [address] [place] [address], [address]\par Office: [phone]\par Fax: [phone]\par \par Practice Manager: Ms. Trena Tran\par Email: [email or web address]\par Phone: [phone]\par \par

## 2020-01-09 NOTE — ASSESSMENT
[FreeTextEntry1] : [age] year old male with acute chest pain, TABOR, palpitations, and known bicuspid aortic valve presents for surgical consult. Dr. OTERO reviewed the cardiac cath images and echocardiogram images with the patient and discussed the case with Dr. MCFARLANE. Dr. OTERO discussed the indications for surgery. Given the patient's symptoms of _____ and severe aortic ______ , the patient meets criteria for surgical intervention. Dr. MCCULLOUGH discussed the risks, benefits and alternatives to surgery and the various surgical approaches, minimally invasive versus sternotomy.   Risks include but not limited to death, heart attack, bleeding, stroke, kidney problems and infection.  The patient was quoted a [default value] operative mortality and complication risk.  Dr. OTERO feels the patient will benefit from and is a candidate for Aortic Valve Replacement. The patient was educated regarding valve options. The patient was educated on the mechanical valve prosthesis which does require Coumadin therapy with a daily pill as well as frequent lab tests. The mechanical valve has excellent longevity and is usually only removed in the cases of infective bacterial prosthetic valve endocarditis or pannus formation. Approximately over 90% of mechanical valves never need to be removed. However, there is a risk with Coumadin, a blood thinner, approximately a 1% thromboembolic risk per year. The On-x valve was also discussed, which requires a lower Coumadin dosage and INR therapeutic range. The other valve option is a biological valve. In general, approximately 50% of these need to be removed at approximately 15 years. However, these valves do not require Coumadin therapy and, therefore, do not have the associated risks of the blood thinner. The patient was also informed about the current use of Transcatheter Aortic Valve Replacement (TAVR) and the possibility of TAVR in the event of failing bioprosthetic valve. The Inspira and MagnaEase valves and their morphology fitted for future TAVRs was discussed as well. The patient was recommended a biological valve and agrees to proceed with surgery. \par All questions were addressed. \par

## 2020-01-09 NOTE — HISTORY OF PRESENT ILLNESS
[FreeTextEntry1] : 54 year old male with history of HTN, asthma, emphysema, prediabetes, presents today for an initial consult for aortic stenosis and ascending aorta aneurysm. Patient is under the care of Dr. Sriram Phan. \par \par Echocardiogram 1/7/20:\par The aortic valve is calcified. Morphology not well visualized. There is moderate aortic stenosis. The peak transvalvular velocity is 3.32 m/s, the mean transvalvular gradient is 27.00 mmHg, and the LVOT/AV velocity integral ratio is 0.27. The aortic valve area (estimated via the continuity method) is 1.10 cm. The calculated aortic valve area indexed to body surface area is 0.60 cm/m. There is mild aortic regurgitation. \par Dilated aortic root and ascending aorta.The aortic root measures 3.90 cm at level of the sinuses of Valsalva (normal 3.1-3.7 cm for men, 2.7-3.3 cm for women). The proximal ascending aorta measures 3.70 cm (normal 2.6-3.4 cm for men, 2.3-3.1 cm for women). \par \par CTA chest 11/25/19: single vessel coronary disease, ascending aorta 4.3cm. 	\par \par Cath 1/7/20  revealed RA 4mmHg, RV 27/5, PA 28/15 (14 mmHg), PCWP 6 mmHg, EDDIE CO: 6.44, EDDIE 3.37, LM luminal, dLAD 99%, apical small segment, mid luminal OM2, 30% pLCx small vessel, RCA dominant, severe AS mean gradient 60, valve area 0.77.\par

## 2020-01-15 ENCOUNTER — APPOINTMENT (OUTPATIENT)
Dept: CARDIOTHORACIC SURGERY | Facility: CLINIC | Age: 55
End: 2020-01-15

## 2020-01-15 DIAGNOSIS — E66.9 OBESITY, UNSPECIFIED: ICD-10-CM

## 2020-01-15 DIAGNOSIS — Z86.79 PERSONAL HISTORY OF OTHER DISEASES OF THE CIRCULATORY SYSTEM: ICD-10-CM

## 2020-01-15 DIAGNOSIS — Z87.09 PERSONAL HISTORY OF OTHER DISEASES OF THE RESPIRATORY SYSTEM: ICD-10-CM

## 2020-01-15 DIAGNOSIS — I71.2 THORACIC AORTIC ANEURYSM, W/OUT RUPTURE: ICD-10-CM

## 2020-01-15 DIAGNOSIS — Z86.010 PERSONAL HISTORY OF COLONIC POLYPS: ICD-10-CM

## 2020-01-15 DIAGNOSIS — Z82.5 FAMILY HISTORY OF ASTHMA AND OTHER CHRONIC LOWER RESPIRATORY DISEASES: ICD-10-CM

## 2020-01-15 DIAGNOSIS — Z86.69 PERSONAL HISTORY OF OTHER DISEASES OF THE NERVOUS SYSTEM AND SENSE ORGANS: ICD-10-CM

## 2020-01-15 DIAGNOSIS — R73.03 PREDIABETES.: ICD-10-CM

## 2020-01-15 DIAGNOSIS — I35.0 NONRHEUMATIC AORTIC (VALVE) STENOSIS: ICD-10-CM

## 2020-01-15 DIAGNOSIS — Z78.9 OTHER SPECIFIED HEALTH STATUS: ICD-10-CM

## 2020-01-15 DIAGNOSIS — E78.1 PURE HYPERGLYCERIDEMIA: ICD-10-CM
